# Patient Record
Sex: MALE | Race: BLACK OR AFRICAN AMERICAN | NOT HISPANIC OR LATINO | URBAN - METROPOLITAN AREA
[De-identification: names, ages, dates, MRNs, and addresses within clinical notes are randomized per-mention and may not be internally consistent; named-entity substitution may affect disease eponyms.]

---

## 2018-11-29 ENCOUNTER — OUTPATIENT (OUTPATIENT)
Dept: OUTPATIENT SERVICES | Facility: HOSPITAL | Age: 58
LOS: 1 days | Discharge: HOME | End: 2018-11-29
Payer: OTHER MISCELLANEOUS

## 2018-11-29 ENCOUNTER — RESULT REVIEW (OUTPATIENT)
Age: 58
End: 2018-11-29

## 2018-11-29 VITALS
SYSTOLIC BLOOD PRESSURE: 150 MMHG | RESPIRATION RATE: 18 BRPM | HEART RATE: 55 BPM | DIASTOLIC BLOOD PRESSURE: 93 MMHG | TEMPERATURE: 97 F | OXYGEN SATURATION: 99 %

## 2018-11-29 VITALS
HEART RATE: 55 BPM | SYSTOLIC BLOOD PRESSURE: 129 MMHG | WEIGHT: 17.64 LBS | OXYGEN SATURATION: 100 % | DIASTOLIC BLOOD PRESSURE: 97 MMHG | RESPIRATION RATE: 15 BRPM | TEMPERATURE: 98 F

## 2018-11-29 DIAGNOSIS — K40.90 UNILATERAL INGUINAL HERNIA, WITHOUT OBSTRUCTION OR GANGRENE, NOT SPECIFIED AS RECURRENT: ICD-10-CM

## 2018-11-29 DIAGNOSIS — D17.6 BENIGN LIPOMATOUS NEOPLASM OF SPERMATIC CORD: ICD-10-CM

## 2018-11-29 PROCEDURE — 88304 TISSUE EXAM BY PATHOLOGIST: CPT | Mod: 26

## 2018-11-29 RX ORDER — OXYCODONE HYDROCHLORIDE 5 MG/1
1 TABLET ORAL
Qty: 30 | Refills: 0
Start: 2018-11-29 | End: 2018-12-03

## 2018-11-29 RX ORDER — OXYCODONE HYDROCHLORIDE 5 MG/1
5 TABLET ORAL ONCE
Qty: 0 | Refills: 0 | Status: DISCONTINUED | OUTPATIENT
Start: 2018-11-29 | End: 2018-11-29

## 2018-11-29 RX ORDER — LISINOPRIL 2.5 MG/1
1 TABLET ORAL
Qty: 0 | Refills: 0 | COMMUNITY

## 2018-11-29 RX ORDER — MUPIROCIN 20 MG/G
1 OINTMENT TOPICAL
Qty: 0 | Refills: 0 | DISCHARGE
Start: 2018-11-29 | End: 2018-12-03

## 2018-11-29 RX ORDER — FENTANYL CITRATE 50 UG/ML
50 INJECTION INTRAVENOUS
Qty: 0 | Refills: 0 | Status: DISCONTINUED | OUTPATIENT
Start: 2018-11-29 | End: 2018-11-29

## 2018-11-29 RX ORDER — SODIUM CHLORIDE 9 MG/ML
1000 INJECTION, SOLUTION INTRAVENOUS
Qty: 0 | Refills: 0 | Status: DISCONTINUED | OUTPATIENT
Start: 2018-11-29 | End: 2018-11-29

## 2018-11-29 RX ORDER — ONDANSETRON 8 MG/1
4 TABLET, FILM COATED ORAL ONCE
Qty: 0 | Refills: 0 | Status: DISCONTINUED | OUTPATIENT
Start: 2018-11-29 | End: 2018-11-29

## 2018-11-29 RX ADMIN — SODIUM CHLORIDE 75 MILLILITER(S): 9 INJECTION, SOLUTION INTRAVENOUS at 15:36

## 2018-11-29 NOTE — BRIEF OPERATIVE NOTE - PROCEDURE
<<-----Click on this checkbox to enter Procedure Left inguinal hernia repair  11/29/2018    Active  [email or web address]

## 2018-11-29 NOTE — BRIEF OPERATIVE NOTE - PRE-OP DX
Inguinal hernia of left side without obstruction or gangrene  11/29/2018    Active  MARIANNA COLLADO

## 2018-12-03 DIAGNOSIS — I10 ESSENTIAL (PRIMARY) HYPERTENSION: ICD-10-CM

## 2018-12-03 DIAGNOSIS — D17.6 BENIGN LIPOMATOUS NEOPLASM OF SPERMATIC CORD: ICD-10-CM

## 2018-12-03 DIAGNOSIS — K40.90 UNILATERAL INGUINAL HERNIA, WITHOUT OBSTRUCTION OR GANGRENE, NOT SPECIFIED AS RECURRENT: ICD-10-CM

## 2018-12-03 LAB — SURGICAL PATHOLOGY FINAL REPORT - CH: SIGNIFICANT CHANGE UP

## 2019-06-04 PROBLEM — I10 ESSENTIAL (PRIMARY) HYPERTENSION: Chronic | Status: ACTIVE | Noted: 2018-11-28

## 2019-06-07 ENCOUNTER — OUTPATIENT (OUTPATIENT)
Dept: OUTPATIENT SERVICES | Facility: HOSPITAL | Age: 59
LOS: 1 days | Discharge: HOME | End: 2019-06-07
Payer: OTHER MISCELLANEOUS

## 2019-06-07 DIAGNOSIS — K40.30 UNILATERAL INGUINAL HERNIA, WITH OBSTRUCTION, WITHOUT GANGRENE, NOT SPECIFIED AS RECURRENT: ICD-10-CM

## 2019-06-07 DIAGNOSIS — D17.6 BENIGN LIPOMATOUS NEOPLASM OF SPERMATIC CORD: ICD-10-CM

## 2019-06-07 DIAGNOSIS — Z98.890 OTHER SPECIFIED POSTPROCEDURAL STATES: Chronic | ICD-10-CM

## 2019-06-07 LAB
ANION GAP SERPL CALC-SCNC: 4 MMOL/L — LOW (ref 5–17)
BASOPHILS # BLD AUTO: 0.07 K/UL — SIGNIFICANT CHANGE UP (ref 0–0.2)
BASOPHILS NFR BLD AUTO: 1.2 % — SIGNIFICANT CHANGE UP (ref 0–2)
BUN SERPL-MCNC: 13 MG/DL — SIGNIFICANT CHANGE UP (ref 7–23)
CALCIUM SERPL-MCNC: 8.9 MG/DL — SIGNIFICANT CHANGE UP (ref 8.5–10.1)
CHLORIDE SERPL-SCNC: 106 MMOL/L — SIGNIFICANT CHANGE UP (ref 96–108)
CO2 SERPL-SCNC: 26 MMOL/L — SIGNIFICANT CHANGE UP (ref 22–31)
CREAT SERPL-MCNC: 0.88 MG/DL — SIGNIFICANT CHANGE UP (ref 0.5–1.3)
EOSINOPHIL # BLD AUTO: 0.6 K/UL — HIGH (ref 0–0.5)
EOSINOPHIL NFR BLD AUTO: 10.6 % — HIGH (ref 0–6)
GLUCOSE SERPL-MCNC: 79 MG/DL — SIGNIFICANT CHANGE UP (ref 70–99)
HCT VFR BLD CALC: 51.3 % — HIGH (ref 39–50)
HGB BLD-MCNC: 16.9 G/DL — SIGNIFICANT CHANGE UP (ref 13–17)
IMM GRANULOCYTES NFR BLD AUTO: 0.2 % — SIGNIFICANT CHANGE UP (ref 0–1.5)
LYMPHOCYTES # BLD AUTO: 2.2 K/UL — SIGNIFICANT CHANGE UP (ref 1–3.3)
LYMPHOCYTES # BLD AUTO: 38.8 % — SIGNIFICANT CHANGE UP (ref 13–44)
MCHC RBC-ENTMCNC: 29.8 PG — SIGNIFICANT CHANGE UP (ref 27–34)
MCHC RBC-ENTMCNC: 32.9 GM/DL — SIGNIFICANT CHANGE UP (ref 32–36)
MCV RBC AUTO: 90.5 FL — SIGNIFICANT CHANGE UP (ref 80–100)
MONOCYTES # BLD AUTO: 0.7 K/UL — SIGNIFICANT CHANGE UP (ref 0–0.9)
MONOCYTES NFR BLD AUTO: 12.3 % — SIGNIFICANT CHANGE UP (ref 2–14)
NEUTROPHILS # BLD AUTO: 2.09 K/UL — SIGNIFICANT CHANGE UP (ref 1.8–7.4)
NEUTROPHILS NFR BLD AUTO: 36.9 % — LOW (ref 43–77)
PLATELET # BLD AUTO: 245 K/UL — SIGNIFICANT CHANGE UP (ref 150–400)
POTASSIUM SERPL-MCNC: 4.5 MMOL/L — SIGNIFICANT CHANGE UP (ref 3.5–5.3)
POTASSIUM SERPL-SCNC: 4.5 MMOL/L — SIGNIFICANT CHANGE UP (ref 3.5–5.3)
RBC # BLD: 5.67 M/UL — SIGNIFICANT CHANGE UP (ref 4.2–5.8)
RBC # FLD: 13.5 % — SIGNIFICANT CHANGE UP (ref 10.3–14.5)
SODIUM SERPL-SCNC: 136 MMOL/L — SIGNIFICANT CHANGE UP (ref 135–145)
WBC # BLD: 5.67 K/UL — SIGNIFICANT CHANGE UP (ref 3.8–10.5)
WBC # FLD AUTO: 5.67 K/UL — SIGNIFICANT CHANGE UP (ref 3.8–10.5)

## 2019-06-07 PROCEDURE — 93010 ELECTROCARDIOGRAM REPORT: CPT

## 2019-06-07 NOTE — CHART NOTE - NSCHARTNOTEFT_GEN_A_CORE
59 year old male presents to PST for right inguinal hernia repair     Plan:  1. Pre-surgical instructions given: NPO post midnight  2. Patient Instructed to take the following medications with sips of water: losartan lisinopril   3.  Labs EKG  done as per surgeon request  4. Medical Evaluation with Dr Warner   5. EZ wash instructions and Mupirocin instructions  given with teach back  6. Pt with increased /100 pt said he has not been taking his meds daily; denies headache blurry vision ; pt going to Dr Warner for medical clearance today

## 2019-06-10 RX ORDER — MUPIROCIN 20 MG/G
1 OINTMENT TOPICAL
Qty: 0 | Refills: 0 | DISCHARGE
Start: 2019-06-10 | End: 2019-06-14

## 2019-06-12 RX ORDER — SODIUM CHLORIDE 9 MG/ML
1000 INJECTION, SOLUTION INTRAVENOUS
Refills: 0 | Status: DISCONTINUED | OUTPATIENT
Start: 2019-06-13 | End: 2019-06-13

## 2019-06-12 RX ORDER — FENTANYL CITRATE 50 UG/ML
50 INJECTION INTRAVENOUS
Refills: 0 | Status: DISCONTINUED | OUTPATIENT
Start: 2019-06-13 | End: 2019-06-13

## 2019-06-13 ENCOUNTER — RESULT REVIEW (OUTPATIENT)
Age: 59
End: 2019-06-13

## 2019-06-13 ENCOUNTER — OUTPATIENT (OUTPATIENT)
Dept: OUTPATIENT SERVICES | Facility: HOSPITAL | Age: 59
LOS: 1 days | Discharge: HOME | End: 2019-06-13
Payer: OTHER MISCELLANEOUS

## 2019-06-13 VITALS
RESPIRATION RATE: 15 BRPM | SYSTOLIC BLOOD PRESSURE: 139 MMHG | DIASTOLIC BLOOD PRESSURE: 99 MMHG | TEMPERATURE: 98 F | HEART RATE: 56 BPM | OXYGEN SATURATION: 99 % | WEIGHT: 181 LBS

## 2019-06-13 VITALS
RESPIRATION RATE: 16 BRPM | SYSTOLIC BLOOD PRESSURE: 140 MMHG | HEART RATE: 54 BPM | OXYGEN SATURATION: 98 % | DIASTOLIC BLOOD PRESSURE: 95 MMHG

## 2019-06-13 DIAGNOSIS — Z98.890 OTHER SPECIFIED POSTPROCEDURAL STATES: Chronic | ICD-10-CM

## 2019-06-13 PROCEDURE — 88304 TISSUE EXAM BY PATHOLOGIST: CPT | Mod: 26

## 2019-06-13 RX ORDER — OXYCODONE HYDROCHLORIDE 5 MG/1
1 TABLET ORAL
Qty: 12 | Refills: 0
Start: 2019-06-13 | End: 2019-06-14

## 2019-06-13 RX ORDER — OXYCODONE AND ACETAMINOPHEN 5; 325 MG/1; MG/1
1 TABLET ORAL EVERY 4 HOURS
Refills: 0 | Status: DISCONTINUED | OUTPATIENT
Start: 2019-06-13 | End: 2019-06-13

## 2019-06-13 RX ORDER — ONDANSETRON 8 MG/1
4 TABLET, FILM COATED ORAL ONCE
Refills: 0 | Status: COMPLETED | OUTPATIENT
Start: 2019-06-13 | End: 2019-06-13

## 2019-06-13 RX ORDER — OXYCODONE HYDROCHLORIDE 5 MG/1
5 TABLET ORAL ONCE
Refills: 0 | Status: DISCONTINUED | OUTPATIENT
Start: 2019-06-13 | End: 2019-06-13

## 2019-06-13 RX ADMIN — OXYCODONE HYDROCHLORIDE 5 MILLIGRAM(S): 5 TABLET ORAL at 13:55

## 2019-06-13 RX ADMIN — ONDANSETRON 4 MILLIGRAM(S): 8 TABLET, FILM COATED ORAL at 12:43

## 2019-06-13 RX ADMIN — OXYCODONE HYDROCHLORIDE 5 MILLIGRAM(S): 5 TABLET ORAL at 12:24

## 2019-06-13 NOTE — ASU DISCHARGE PLAN (ADULT/PEDIATRIC) - CALL YOUR DOCTOR IF YOU HAVE ANY OF THE FOLLOWING:
Nausea and vomiting that does not stop/Swelling that gets worse/Fever greater than (need to indicate Fahrenheit or Celsius)/Unable to urinate/Bleeding that does not stop/Numbness, tingling, color or temperature change to extremity/Wound/Surgical Site with redness, or foul smelling discharge or pus/Inability to tolerate liquids or foods/Pain not relieved by Medications/Excessive diarrhea/Increased irritability or sluggishness

## 2019-06-13 NOTE — ASU DISCHARGE PLAN (ADULT/PEDIATRIC) - CARE PROVIDER_API CALL
Ru Yates)  Surgery; Surgical Critical Care  [address] [place] [address]  Phone: [phone]  Fax: [phone]  Follow Up Time:

## 2019-06-14 ENCOUNTER — EMERGENCY (EMERGENCY)
Facility: HOSPITAL | Age: 59
LOS: 0 days | Discharge: HOME | End: 2019-06-14
Attending: EMERGENCY MEDICINE | Admitting: EMERGENCY MEDICINE
Payer: OTHER MISCELLANEOUS

## 2019-06-14 VITALS
DIASTOLIC BLOOD PRESSURE: 95 MMHG | OXYGEN SATURATION: 96 % | TEMPERATURE: 98 F | SYSTOLIC BLOOD PRESSURE: 152 MMHG | HEART RATE: 62 BPM | RESPIRATION RATE: 18 BRPM

## 2019-06-14 VITALS — HEIGHT: 72 IN | WEIGHT: 179.9 LBS

## 2019-06-14 DIAGNOSIS — I10 ESSENTIAL (PRIMARY) HYPERTENSION: ICD-10-CM

## 2019-06-14 DIAGNOSIS — R11.2 NAUSEA WITH VOMITING, UNSPECIFIED: ICD-10-CM

## 2019-06-14 DIAGNOSIS — Z98.890 OTHER SPECIFIED POSTPROCEDURAL STATES: Chronic | ICD-10-CM

## 2019-06-14 DIAGNOSIS — G89.18 OTHER ACUTE POSTPROCEDURAL PAIN: ICD-10-CM

## 2019-06-14 DIAGNOSIS — K59.00 CONSTIPATION, UNSPECIFIED: ICD-10-CM

## 2019-06-14 DIAGNOSIS — R10.9 UNSPECIFIED ABDOMINAL PAIN: ICD-10-CM

## 2019-06-14 DIAGNOSIS — F17.200 NICOTINE DEPENDENCE, UNSPECIFIED, UNCOMPLICATED: ICD-10-CM

## 2019-06-14 PROBLEM — K40.90 UNILATERAL INGUINAL HERNIA, WITHOUT OBSTRUCTION OR GANGRENE, NOT SPECIFIED AS RECURRENT: Chronic | Status: ACTIVE | Noted: 2019-06-07

## 2019-06-14 LAB
ALBUMIN SERPL ELPH-MCNC: 4.2 G/DL — SIGNIFICANT CHANGE UP (ref 3.3–5)
ALP SERPL-CCNC: 81 U/L — SIGNIFICANT CHANGE UP (ref 40–120)
ALT FLD-CCNC: 66 U/L — SIGNIFICANT CHANGE UP (ref 12–78)
ANION GAP SERPL CALC-SCNC: 12 MMOL/L — SIGNIFICANT CHANGE UP (ref 5–17)
AST SERPL-CCNC: 32 U/L — SIGNIFICANT CHANGE UP (ref 15–37)
BASOPHILS # BLD AUTO: 0.06 K/UL — SIGNIFICANT CHANGE UP (ref 0–0.2)
BASOPHILS NFR BLD AUTO: 0.5 % — SIGNIFICANT CHANGE UP (ref 0–2)
BILIRUB SERPL-MCNC: 1.1 MG/DL — SIGNIFICANT CHANGE UP (ref 0.2–1.2)
BUN SERPL-MCNC: 15 MG/DL — SIGNIFICANT CHANGE UP (ref 7–23)
CALCIUM SERPL-MCNC: 9.3 MG/DL — SIGNIFICANT CHANGE UP (ref 8.5–10.1)
CHLORIDE SERPL-SCNC: 102 MMOL/L — SIGNIFICANT CHANGE UP (ref 96–108)
CO2 SERPL-SCNC: 22 MMOL/L — SIGNIFICANT CHANGE UP (ref 22–31)
CREAT SERPL-MCNC: 0.94 MG/DL — SIGNIFICANT CHANGE UP (ref 0.5–1.3)
EOSINOPHIL # BLD AUTO: 0.06 K/UL — SIGNIFICANT CHANGE UP (ref 0–0.5)
EOSINOPHIL NFR BLD AUTO: 0.5 % — SIGNIFICANT CHANGE UP (ref 0–6)
GLUCOSE SERPL-MCNC: 129 MG/DL — HIGH (ref 70–99)
HCT VFR BLD CALC: 54.5 % — HIGH (ref 39–50)
HGB BLD-MCNC: 18.8 G/DL — HIGH (ref 13–17)
IMM GRANULOCYTES NFR BLD AUTO: 0.4 % — SIGNIFICANT CHANGE UP (ref 0–1.5)
LACTATE SERPL-SCNC: 1.8 MMOL/L — SIGNIFICANT CHANGE UP (ref 0.7–2)
LIDOCAIN IGE QN: 67 U/L — LOW (ref 73–393)
LYMPHOCYTES # BLD AUTO: 18.2 % — SIGNIFICANT CHANGE UP (ref 13–44)
LYMPHOCYTES # BLD AUTO: 2.37 K/UL — SIGNIFICANT CHANGE UP (ref 1–3.3)
MAGNESIUM SERPL-MCNC: 1.9 MG/DL — SIGNIFICANT CHANGE UP (ref 1.6–2.6)
MCHC RBC-ENTMCNC: 29.9 PG — SIGNIFICANT CHANGE UP (ref 27–34)
MCHC RBC-ENTMCNC: 34.5 GM/DL — SIGNIFICANT CHANGE UP (ref 32–36)
MCV RBC AUTO: 86.8 FL — SIGNIFICANT CHANGE UP (ref 80–100)
MONOCYTES # BLD AUTO: 1.28 K/UL — HIGH (ref 0–0.9)
MONOCYTES NFR BLD AUTO: 9.8 % — SIGNIFICANT CHANGE UP (ref 2–14)
NEUTROPHILS # BLD AUTO: 9.22 K/UL — HIGH (ref 1.8–7.4)
NEUTROPHILS NFR BLD AUTO: 70.6 % — SIGNIFICANT CHANGE UP (ref 43–77)
PLATELET # BLD AUTO: 272 K/UL — SIGNIFICANT CHANGE UP (ref 150–400)
POTASSIUM SERPL-MCNC: 3.4 MMOL/L — LOW (ref 3.5–5.3)
POTASSIUM SERPL-SCNC: 3.4 MMOL/L — LOW (ref 3.5–5.3)
PROT SERPL-MCNC: 8.8 GM/DL — HIGH (ref 6–8.3)
RBC # BLD: 6.28 M/UL — HIGH (ref 4.2–5.8)
RBC # FLD: 13.2 % — SIGNIFICANT CHANGE UP (ref 10.3–14.5)
SODIUM SERPL-SCNC: 136 MMOL/L — SIGNIFICANT CHANGE UP (ref 135–145)
WBC # BLD: 13.04 K/UL — HIGH (ref 3.8–10.5)
WBC # FLD AUTO: 13.04 K/UL — HIGH (ref 3.8–10.5)

## 2019-06-14 PROCEDURE — 74177 CT ABD & PELVIS W/CONTRAST: CPT | Mod: 26

## 2019-06-14 PROCEDURE — 93010 ELECTROCARDIOGRAM REPORT: CPT

## 2019-06-14 PROCEDURE — 99285 EMERGENCY DEPT VISIT HI MDM: CPT

## 2019-06-14 RX ORDER — ONDANSETRON 8 MG/1
1 TABLET, FILM COATED ORAL
Qty: 12 | Refills: 0
Start: 2019-06-14 | End: 2019-06-16

## 2019-06-14 RX ORDER — AMLODIPINE BESYLATE 2.5 MG/1
1 TABLET ORAL
Qty: 0 | Refills: 0 | DISCHARGE

## 2019-06-14 RX ORDER — MORPHINE SULFATE 50 MG/1
4 CAPSULE, EXTENDED RELEASE ORAL ONCE
Refills: 0 | Status: DISCONTINUED | OUTPATIENT
Start: 2019-06-14 | End: 2019-06-14

## 2019-06-14 RX ORDER — ONDANSETRON 8 MG/1
1 TABLET, FILM COATED ORAL
Qty: 16 | Refills: 0
Start: 2019-06-14 | End: 2019-06-17

## 2019-06-14 RX ORDER — LISINOPRIL 2.5 MG/1
1 TABLET ORAL
Qty: 0 | Refills: 0 | DISCHARGE

## 2019-06-14 RX ORDER — SODIUM CHLORIDE 9 MG/ML
1000 INJECTION INTRAMUSCULAR; INTRAVENOUS; SUBCUTANEOUS ONCE
Refills: 0 | Status: COMPLETED | OUTPATIENT
Start: 2019-06-14 | End: 2019-06-14

## 2019-06-14 RX ORDER — ONDANSETRON 8 MG/1
4 TABLET, FILM COATED ORAL ONCE
Refills: 0 | Status: COMPLETED | OUTPATIENT
Start: 2019-06-14 | End: 2019-06-14

## 2019-06-14 RX ORDER — FAMOTIDINE 10 MG/ML
20 INJECTION INTRAVENOUS ONCE
Refills: 0 | Status: COMPLETED | OUTPATIENT
Start: 2019-06-14 | End: 2019-06-14

## 2019-06-14 RX ORDER — DOCUSATE SODIUM 100 MG
2 CAPSULE ORAL
Qty: 0 | Refills: 0 | DISCHARGE

## 2019-06-14 RX ADMIN — SODIUM CHLORIDE 1000 MILLILITER(S): 9 INJECTION INTRAMUSCULAR; INTRAVENOUS; SUBCUTANEOUS at 17:52

## 2019-06-14 RX ADMIN — MORPHINE SULFATE 4 MILLIGRAM(S): 50 CAPSULE, EXTENDED RELEASE ORAL at 16:47

## 2019-06-14 RX ADMIN — SODIUM CHLORIDE 2000 MILLILITER(S): 9 INJECTION INTRAMUSCULAR; INTRAVENOUS; SUBCUTANEOUS at 16:48

## 2019-06-14 RX ADMIN — ONDANSETRON 4 MILLIGRAM(S): 8 TABLET, FILM COATED ORAL at 16:47

## 2019-06-14 RX ADMIN — MORPHINE SULFATE 4 MILLIGRAM(S): 50 CAPSULE, EXTENDED RELEASE ORAL at 17:15

## 2019-06-14 RX ADMIN — SODIUM CHLORIDE 1000 MILLILITER(S): 9 INJECTION INTRAMUSCULAR; INTRAVENOUS; SUBCUTANEOUS at 19:03

## 2019-06-14 RX ADMIN — FAMOTIDINE 20 MILLIGRAM(S): 10 INJECTION INTRAVENOUS at 16:52

## 2019-06-14 NOTE — ED PROVIDER NOTE - PROGRESS NOTE DETAILS
Rebecca Robles: Paging Dr. Yates. Rebecca NP for Dr. Robles:  Case discussed with Dr. Yates who suspects symptoms likely due to affects from general anesthesia. Agrees with CT abd, pelvis. If further problems or issues develop, discuss with surgical resident. Dr. Robles:  Pt examined by Dr. Yates & Sx resident at ED bedside, who also reviewed CT images.  Official CT report pending. Further management pending CT report & whether pt can tolerate po trial.  If CT acceptable & pt + tolerates po, Dr. GAYLE advises D/C home w/ escript for Zofran, outpt office f/u next week. Rebecca KAUR for Dr. Robles: CT abd pelvis +constipation post surgical changes. Will attempt PO trial now. Dr. Robles:  Pt symptomatically improved, + tolerating po fluids trial & crackers.  Pt stable for D/C home, will escript Zofran script, outpt f/u next week Dr. Yates.

## 2019-06-14 NOTE — ED PROVIDER NOTE - GASTROINTESTINAL, MLM
Abdomen soft, non-tender, no guarding. Bowel sounds somewhat hypoactive, normal pitch. Surgical dressing in place right inguinal area appears dry, +surrounding TTP RLQ. No guarding, rebound.

## 2019-06-14 NOTE — ED ADULT TRIAGE NOTE - CHIEF COMPLAINT QUOTE
pt presents to ED s/p right inguinal hernia repair with MD Mitchell yesterday. pt states he has been vomiting and having left sided abdominal pain since discharge yesterday.

## 2019-06-14 NOTE — ED ADULT NURSE REASSESSMENT NOTE - NS ED NURSE REASSESS COMMENT FT1
Patient refusing to have rectal temperature taken.
change of shift report received from ANETTE Smith for continuation of care. pt is alert and oriented, accompanied by family member at bedside. pt has been assessed by the surgeon, pt is to be p.o. challenged as per MD Robles. IV access removed as requested, MD Robles made aware.

## 2019-06-14 NOTE — ED PROVIDER NOTE - ENMT, MLM
Airway patent, Nasal mucosa clear. MM Mildly dry. Throat has no vesicles, no oropharyngeal exudates and uvula is midline.

## 2019-06-14 NOTE — ED PROVIDER NOTE - CARE PROVIDER_API CALL
Ru Yates)  Surgery; Surgical Critical Care  [address] [place] [address]  Phone: [phone]  Fax: [phone]  Follow Up Time:

## 2019-06-14 NOTE — ED ADULT NURSE NOTE - NSIMPLEMENTINTERV_GEN_ALL_ED
Implemented All Universal Safety Interventions:  [place] to call system. Call bell, personal items and telephone within reach. Instruct patient to call for assistance. Room bathroom lighting operational. Non-slip footwear when patient is off stretcher. Physically safe environment: no spills, clutter or unnecessary equipment. Stretcher in lowest position, wheels locked, appropriate side rails in place.

## 2019-06-14 NOTE — ED PROVIDER NOTE - CARE PLAN
Principal Discharge DX:	Postoperative nausea and vomiting  Secondary Diagnosis:	Postoperative abdominal pain

## 2019-06-14 NOTE — ED STATDOCS - PROGRESS NOTE DETAILS
Rebecca Maria for attending Dr. Altamirano: 58 y/o male with a PMHx of inguinal hernia, HTN presents to the ED c/o post op complication. Pt had a right inguinal hernia repair yesterday by Dr. Yates. Pt has been vomiting since surgery. Pt did not take HTN medications today. No other complaints at this time. Will send pt to main ED for further evaluation.

## 2019-06-14 NOTE — ED PROVIDER NOTE - NSFOLLOWUPINSTRUCTIONS_ED_ALL_ED_FT
Small sips oral fluids frequently.  Zofran ODT 1 tab under the tongue every 6 hours as needed fro nausea.  Emphasize oral fluids more than solid foods at this early post-operative point.  Stool softeners as per prior.  Follow up with Dr. Yates in 2 weeks. Small sips oral fluids frequently.  Zofran ODT 1 tab under the tongue every 6 hours as needed fro nausea.  Emphasize oral fluids more than solid foods at this early post-operative point.  Stool softeners as per prior.  Follow up with Dr. Yates in 2 weeks.    ED evaluation and management discussed with the patient and family (if available) in detail.  Close PMD follow up encouraged.  Strict ED return instructions discussed in detail and patient given the opportunity to ask any questions about their discharge diagnosis and instructions. Patient verbalized understanding.    Pain Relief Before and After Surgery  Image   Pain relief is an important part of your overall care before, during, and after surgery. You and your health care provider will work together to make a plan to manage pain that you have before surgery (preoperative) and after surgery (postoperative). Addressing pain before surgery lessens the pain that you will have after surgery.    Make sure that you fully understand and agree with your pain relief plan. If you have questions or concerns, it is important to discuss them with your health care provider.    If you have pain that is not controlled by medicine, tell your health care provider. Severe pain after surgery may:  Prevent sleep.  Decrease your ability to breathe deeply and to cough. This can result in pneumonia or upper airway infections.  Cause your heart to beat more quickly.  Cause your blood pressure to be higher.  Increase your risk for stomach and digestive problems.  Slow down wound healing.  Lead to depression, anxiety, and feelings of helplessness.  Your health care provider may use more than one method at a time to help relieve your pain. Using this approach may allow you to eat, move around, and possibly leave the hospital sooner.    What are options for managing pain before and after surgery?  Oral pain medicines     Pain medicines taken by mouth (orally) include:  Non-narcotic medicines:  Acetaminophen.  NSAIDs, such as ibuprofen and naproxen.  Muscle relaxants. These may relieve pain caused by muscle spasms.  Anticonvulsants. These medicines are usually used to treat seizures. They may help to lessen nerve pain.  Opioids. These medicines relieve pain by binding to pain receptors in the brain and spinal cord (narcotic pain medicines). Opioids may help relieve short-term (acute) postoperative pain that is moderate to moderately severe.  Opioids are often combined with non-narcotic medicines to improve pain relief, lower the risk of side effects, and lower the chance of addiction.  To help prevent addiction, opioids are given for short periods of time in careful doses. If you follow instructions from your health care provider and you do not have a history of substance abuse, your risk of becoming addicted to opioids is low.  Some of these medicines may be available in injectable form. They may be given through an IV if you are unable to eat or drink.    As-needed pain control    You can receive pain medicine when you need it, through an IV or as a pill or liquid. When you tell your health care provider that you are having pain, he or she will give you the proper pain medicine.    Medicine that numbs an area    You may be given pain medicine that numbs an area (local anesthetic):  As an injection near your painful area (local infiltration).  As an injection near the nerve that provides feeling to a specific part of your body (peripheral nerve block).  As an injection in your spine (spinal block).  Through a local anesthetic reservoir pump. For this method, one or more catheters are inserted into your incision at the end of your procedure. These catheters are connected to a device that is filled with a non-narcotic pain medicine. Medicine gradually empties into your incision area over the next several days.  Continuous epidural pain control    With this method, you receive pain medicine through a small, thin tube (catheter) that is inserted into your back, near your spinal cord. Medicine flows through the catheter to lessen pain in areas of your body that are below the catheter. The catheter is usually put into the back shortly before surgery. It may be left in until you can eat, take medicine by mouth, pass urine, and have a bowel movement.    This method may be recommended if you are having surgery on your abdomen, hip area, or legs. This method of pain relief may help you heal faster because you may be able to do these things sooner:  Regain normal bowel and bladder function.  Return to eating.  Get up and walk.  IV patient-controlled analgesia (PCA) pump    With this method, you receive pain medicine through an IV that is connected to a PCA pump. The PCA pump gives you a specific amount of medicine when you push a button. This lets you control how much medicine you receive. You are the only person who should push this button. The pump is set up so that you cannot accidentally give yourself too much medicine.    You will be able to start using your PCA pump in the recovery room after your procedure. Tell your health care provider:  If you are having too much pain.  If you cannot push the button.  If you are feeling too sleepy or nauseous.  Other pain control methods    Other methods of pain relief after surgery include:  Heat and cold therapy.  Massage.  Topical analgesics. These are patches, creams, and gels that can be applied on the skin.  Steroid medicines. These medicines may be given to lessen swelling.  Physical therapy. A physical therapist will work with you to meet goals, such as feeling and functioning better. Physical therapy usually includes specific exercises that are tailored to your needs.  Transcutaneous electrical nerve stimulation (TENS). This method sends electrical signals through the skin to interrupt pain signals.  Cognitive behavioral therapy (CBT). This therapy helps you learn coping skills for dealing with pain.  What are some questions to ask my health care provider?  What pain relief options would be best for me?  What are the risks of each option?  What are the benefits of each option?  How long will I need pain relief after surgery?  Summary  A plan to manage pain that you may have before surgery (preoperative) and after surgery (postoperative) is an important part of your overall care.  Pain management options include medicines and non-medical therapies, such as physical therapy, massage, and heat or cold therapy.  Pain management medicines include opioids and non-narcotic medicines such as NSAIDs, steroids, or local anesthetics.  Pain medicines can have side effects. Side effects of opioids include constipation, nausea, excessive sleepiness, and risk of addiction. Your health care provider will work with you to prevent or manage these side effects and risks.  This information is not intended to replace advice given to you by your health care provider. Make sure you discuss any questions you have with your health care provider. Small sips oral fluids frequently.  Zofran ODT 1 tab under the tongue every 6 hours as needed fro nausea.  Emphasize oral fluids more than solid foods at this early post-operative point.  Stool softeners as per prior.  Follow up with Dr. Yates in 2 weeks.    ED evaluation and management discussed with the patient and family (if available) in detail.  Close PMD follow up encouraged.  Strict ED return instructions discussed in detail and patient given the opportunity to ask any questions about their discharge diagnosis and instructions. Patient verbalized understanding.    Pain Relief Before and After Surgery  Image   Pain relief is an important part of your overall care before, during, and after surgery. You and your health care provider will work together to make a plan to manage pain that you have before surgery (preoperative) and after surgery (postoperative). Addressing pain before surgery lessens the pain that you will have after surgery.    Make sure that you fully understand and agree with your pain relief plan. If you have questions or concerns, it is important to discuss them with your health care provider.    If you have pain that is not controlled by medicine, tell your health care provider. Severe pain after surgery may:  Prevent sleep.  Decrease your ability to breathe deeply and to cough. This can result in pneumonia or upper airway infections.  Cause your heart to beat more quickly.  Cause your blood pressure to be higher.  Increase your risk for stomach and digestive problems.  Slow down wound healing.  Lead to depression, anxiety, and feelings of helplessness.  Your health care provider may use more than one method at a time to help relieve your pain. Using this approach may allow you to eat, move around, and possibly leave the hospital sooner.    What are options for managing pain before and after surgery?  Oral pain medicines     Pain medicines taken by mouth (orally) include:  Non-narcotic medicines:  Acetaminophen.  NSAIDs, such as ibuprofen and naproxen.  Muscle relaxants. These may relieve pain caused by muscle spasms.  Anticonvulsants. These medicines are usually used to treat seizures. They may help to lessen nerve pain.  Opioids. These medicines relieve pain by binding to pain receptors in the brain and spinal cord (narcotic pain medicines). Opioids may help relieve short-term (acute) postoperative pain that is moderate to moderately severe.  Opioids are often combined with non-narcotic medicines to improve pain relief, lower the risk of side effects, and lower the chance of addiction.  To help prevent addiction, opioids are given for short periods of time in careful doses. If you follow instructions from your health care provider and you do not have a history of substance abuse, your risk of becoming addicted to opioids is low.  Some of these medicines may be available in injectable form. They may be given through an IV if you are unable to eat or drink.    As-needed pain control    You can receive pain medicine when you need it, through an IV or as a pill or liquid. When you tell your health care provider that you are having pain, he or she will give you the proper pain medicine.    Medicine that numbs an area    You may be given pain medicine that numbs an area (local anesthetic):  As an injection near your painful area (local infiltration).  As an injection near the nerve that provides feeling to a specific part of your body (peripheral nerve block).  As an injection in your spine (spinal block).  Through a local anesthetic reservoir pump. For this method, one or more catheters are inserted into your incision at the end of your procedure. These catheters are connected to a device that is filled with a non-narcotic pain medicine. Medicine gradually empties into your incision area over the next several days.  Continuous epidural pain control    With this method, you receive pain medicine through a small, thin tube (catheter) that is inserted into your back, near your spinal cord. Medicine flows through the catheter to lessen pain in areas of your body that are below the catheter. The catheter is usually put into the back shortly before surgery. It may be left in until you can eat, take medicine by mouth, pass urine, and have a bowel movement.    This method may be recommended if you are having surgery on your abdomen, hip area, or legs. This method of pain relief may help you heal faster because you may be able to do these things sooner:  Regain normal bowel and bladder function.  Return to eating.  Get up and walk.  IV patient-controlled analgesia (PCA) pump    With this method, you receive pain medicine through an IV that is connected to a PCA pump. The PCA pump gives you a specific amount of medicine when you push a button. This lets you control how much medicine you receive. You are the only person who should push this button. The pump is set up so that you cannot accidentally give yourself too much medicine.    You will be able to start using your PCA pump in the recovery room after your procedure. Tell your health care provider:  If you are having too much pain.  If you cannot push the button.  If you are feeling too sleepy or nauseous.  Other pain control methods    Other methods of pain relief after surgery include:  Heat and cold therapy.  Massage.  Topical analgesics. These are patches, creams, and gels that can be applied on the skin.  Steroid medicines. These medicines may be given to lessen swelling.  Physical therapy. A physical therapist will work with you to meet goals, such as feeling and functioning better. Physical therapy usually includes specific exercises that are tailored to your needs.  Transcutaneous electrical nerve stimulation (TENS). This method sends electrical signals through the skin to interrupt pain signals.  Cognitive behavioral therapy (CBT). This therapy helps you learn coping skills for dealing with pain.  What are some questions to ask my health care provider?  What pain relief options would be best for me?  What are the risks of each option?  What are the benefits of each option?  How long will I need pain relief after surgery?  Summary  A plan to manage pain that you may have before surgery (preoperative) and after surgery (postoperative) is an important part of your overall care.  Pain management options include medicines and non-medical therapies, such as physical therapy, massage, and heat or cold therapy.  Pain management medicines include opioids and non-narcotic medicines such as NSAIDs, steroids, or local anesthetics.  Pain medicines can have side effects. Side effects of opioids include constipation, nausea, excessive sleepiness, and risk of addiction. Your health care provider will work with you to prevent or manage these side effects and risks.  This information is not intended to replace advice given to you by your health care provider. Make sure you discuss any questions you have with your health care provider.        Nausea / Vomiting    Nausea is the feeling that you have to vomit. As nausea gets worse, it can lead to vomiting. Vomiting puts you at an increased risk for dehydration. Older adults and people with other diseases or a weak immune system are at higher risk for dehydration. Drink clear fluids in small but frequent amounts as tolerated. Eat bland, easy-to-digest foods in small amounts as tolerated.    SEEK IMMEDIATE MEDICAL CARE IF YOU HAVE ANY OF THE FOLLOWING SYMPTOMS: fever, inability to keep sufficient fluids down, black or bloody vomitus, black or bloody stools, lightheadedness/dizziness, chest pain, severe headache, rash, shortness of breath, cold or clammy skin, confusion, pain with urination, or any signs of dehydration.          Preventing Constipation After Surgery  Constipation is a common problem after surgery. Many things can make constipation more likely after a surgery, including:  Certain medicines, especially numbing medicines (anesthetics) and very strong pain medicines called opioids.  Feeling stressed because of the surgery.  Eating different foods than normal.  Being less active.  Symptoms of constipation include:  Having fewer than three bowel movements a week.  Straining to have a bowel movement.  Having hard, dry, or larger-than-normal stools.  Feeling full or bloated.  Having pain in the lower abdomen.  Not feeling relief after having a bowel movement.  You can take steps to help prevent constipation after surgery.    Follow these instructions at home:  Eating and drinking     Image   Eat foods that have a lot of fiber in them. These include fresh fruits and vegetables, whole grains, and beans.  Limit foods that are high in fat and processed sugars, such as fried and sweet foods. These include french fries, hamburgers, cookies, and candy.  Take a fiber supplement as told by your health care provider. If you are not taking a fiber supplement and you think you are not getting enough fiber from foods, talk to your health care provider about adding a fiber supplement to your diet.  Drink enough fluid to keep your urine pale yellow.  Drink clear fluids, especially water. Avoid drinking alcohol, caffeine, and soda. These can make constipation worse.  Activity     After surgery, return to your normal activities slowly, or when your health care provider says it is okay.  Start walking as soon as you can. Try to go a little farther each day.  Once your health care provider approves, do some sort of regular exercise. This helps prevent constipation.  Bowel movements     Go to the restroom when you have the urge to go. Do not hold it in.  Try drinking something hot to get a bowel movement started.  Keep track of how often you use the restroom.  Medicine     Take over-the-counter and prescription medicines only as told by your health care provider.  Talk to your health care provider about medicines that may help prevent constipation, particularly if you have a history of constipation. Your health care provider may suggest a stool softener, laxative, or fiber supplement.  Do not take any medicines without talking to your health care provider first.  Contact a health care provider if:  You used stool softeners or laxatives and still have not had a bowel movement within 24–48 hours after using them.  You have not had a bowel movement in 3 days.  You have a fever.  Get help right away if:  Your constipation lasts for more than 4 days or gets worse.  You have bright red blood in your stool.  You have pain in the abdomen or rectum.  You have very bad cramping.  You have thin, pencil-like stools.  You have unexplained weight loss.  Summary  Constipation is a common problem after surgery. Many things can make constipation more likely after a surgery, including certain medicines, eating different foods than normal, and being less active.  Symptoms of constipation include having fewer than three bowel movements a week, straining to have a bowel movement, pain in the lower abdomen, and feeling full or bloated.  A diet rich in high-fiber foods, fluids, physical activity, and medicines, such as stool softeners and laxatives, can help prevent constipation.  This information is not intended to replace advice given to you by your health care provider. Make sure you discuss any questions you have with your health care provider.    Document Released: 04/14/2014 Document Revised: 03/19/2018 Document Reviewed: 03/19/2018  [employer] Patient Education © 2019 [employer]

## 2019-06-14 NOTE — ED ADULT NURSE NOTE - OBJECTIVE STATEMENT
pt is a 59 y.o. male c/o post op complication, s/p hernia operation POD#1, by Carmen HORTON. pt c/o nausea and constipation. vital signs as charted. will continue to monitor. please see MD note for further information.

## 2019-06-14 NOTE — ED PROVIDER NOTE - ST/T WAVE
Did call pt yesterday and d/w her about radiologist recommendation of breast bx given result of breast mri. She already had this scheduled.
Pt called requesting to speak with Dr bardales mammogram
normal

## 2019-06-14 NOTE — CONSULT NOTE ADULT - ASSESSMENT
A/P:  59M POD 1 from Right Inguinal hernia repair with mesh.  Postoperative nausea and vomiting.  No hernia present on exam and no acute process noted within the abdomen on CT imaging.  Constipation.  - Pain control with non-narcotics is tolerable  - Nausea control PRN  - Continue stool softeners  - No acute surgical intervention neccessary  - Please have patient follow up with Dr. Yates for postop visit in 2 weeks    discussed and seen at bedside with Dr. Yates

## 2019-06-14 NOTE — ED PROVIDER NOTE - OBJECTIVE STATEMENT
60 y/o male with a PMHx of HTN, inguinal hernia, s/p 1 day open right inguinal hernia repair presents to the ED c/o abd pain and vomiting since last night. As per family at bed side pt had a right inguinal hernia repair yesterday by Dr. Yates and DC that evening. Pt has had nausea and vomiting since surgery. Since last night pt has had over 25 episodes of vomiting. States this afternoon vomiting began to have trace amounts of blood in it. Pt also with diaphoresis, poor appetite. While in ED pt has been vomiting, last was 2 minutes ago. +Subjective fever, chills. Pt describes abd pain as constant, cramping and severe. Pain worse when walking. Has not had a BM for the past two days, usually has a BM every day. Surgeon: Dr. Yates PMD: Dr. Warner.

## 2019-06-14 NOTE — ED PROVIDER NOTE - CLINICAL SUMMARY MEDICAL DECISION MAKING FREE TEXT BOX
60 y/o male PO day number 1 s/p elective right open inguinal hernia repair ambulatory to ED regarding pain, persistent nausea and vomiting. +TTP RLQ, surgical bandage appears dry and intact.   Plan: EKG, labs, temperature, IV fluids, IV Pepcid, morphine, Zofran and discuss with surgeon.

## 2019-06-14 NOTE — CONSULT NOTE ADULT - SUBJECTIVE AND OBJECTIVE BOX
Called to ED to evaluate patient for recent postop nausea/vomiting and abdominal pain.  Seen and examined at bedside with Dr. Yates immediately.    Mr. Levine is a 59M with PMH of who presents with a chief complaint of nausea and vomiting that has persisted since surgery yesterday.  Patient is POD 1 from an open right inguinal hernia repair with mesh (6/13/2019), had an uneventful postoperative course and was discharged by [facility].  Patient had Hx of Left inguinal hernia repair and admits less pain this time than last time.  Patient states he was up all night due to nausea and vomiting and came into the hospital after noting BRB in vomitus.  Admits retching all night and some mild pain over the hernia repair site.     PMH:  HTN  PSH:  Left inguinal hernia repair, Right inguinal hernia repair (6/13/2019)  + Smoking Hx  NKDA    PE:  ICU Vital Signs Last 24 Hrs  T(C): 36.7 (14 Jun 2019 18:48), Max: 36.7 (14 Jun 2019 18:48)  T(F): 98 (14 Jun 2019 18:48), Max: 98 (14 Jun 2019 18:48)  HR: 60 (14 Jun 2019 18:48) (60 - 81)  BP: 165/93 (14 Jun 2019 18:48) (165/93 - 192/108)  BP(mean): --  ABP: --  ABP(mean): --  RR: 18 (14 Jun 2019 18:48) (18 - 20)  SpO2: 97% (14 Jun 2019 18:48) (97% - 100%)    Gen:  NAD, AOx3  CV:  s1 s2  Pulm:  CTAB  abd:  soft, nontender, nondistended  Right Groin:  Dressing removed, some minimal swelling noted, no erythema, no discharge, steri strips remain in place, no palpable hernia, no breakdown of sutures.    Ext:  no edema    Labs:                        18.8   13.04 )-----------( 272      ( 14 Jun 2019 16:41 )             54.5   06-14    136  |  102  |  15  ----------------------------<  129<H>  3.4<L>   |  22  |  0.94    Ca    9.3      14 Jun 2019 16:41  Mg     1.9     06-14    TPro  8.8<H>  /  Alb  4.2  /  TBili  1.1  /  DBili  x   /  AST  32  /  ALT  66  /  AlkPhos  81  06-14        Imaging:  CT abdomen/pelvis:  IMPRESSION:  1.  Limited evaluation due to the lack of significant intra-abdominal fat.    2.  Abundant stool is seen throughout the right colon, left colon and   rectosigmoid colon, correlate for constipation. There may be mild wall   thickening versus under distention of the left colon, correlate for a   mild left colitis.  There is no evidence for bowel obstruction. A normal   appendix is identified.    3.  Patient is noted to be status post right inguinal hernia repair. Mild   fluid is seen within the right inguinal canal. Mild air is seen within   the right inguinal canal and tracking superiorly along the right anterior   abdominal wall intramuscular fascia. A few locules of air are seen along   the right paracolic gutter, which is technically extraperitoneal,   presumably related to recent right inguinal surgery. An additional tiny   locule of air is seen adjacent to the inferior tip of the liver, also   presumably postoperative in nature.    4.  Several subcentimeter renal hypodensities are not characterized on   the current exam but probably represent cysts.    5.  Mild hepatomegaly.    6.  A 9 mm cortically based lucent lesion in the left iliac wing with a   sclerotic margin, which has a benign appearance. Amenable to follow-up or   characterization with MRI.    7.  Other findings, as above.

## 2019-06-17 DIAGNOSIS — K40.30 UNILATERAL INGUINAL HERNIA, WITH OBSTRUCTION, WITHOUT GANGRENE, NOT SPECIFIED AS RECURRENT: ICD-10-CM

## 2019-06-17 DIAGNOSIS — F17.210 NICOTINE DEPENDENCE, CIGARETTES, UNCOMPLICATED: ICD-10-CM

## 2019-06-17 DIAGNOSIS — I10 ESSENTIAL (PRIMARY) HYPERTENSION: ICD-10-CM

## 2019-06-17 DIAGNOSIS — D17.6 BENIGN LIPOMATOUS NEOPLASM OF SPERMATIC CORD: ICD-10-CM

## 2019-06-17 LAB — SURGICAL PATHOLOGY STUDY: SIGNIFICANT CHANGE UP

## 2019-08-26 ENCOUNTER — APPOINTMENT (OUTPATIENT)
Dept: GASTROENTEROLOGY | Facility: CLINIC | Age: 59
End: 2019-08-26

## 2019-09-10 ENCOUNTER — APPOINTMENT (OUTPATIENT)
Dept: GASTROENTEROLOGY | Facility: CLINIC | Age: 59
End: 2019-09-10

## 2023-06-05 NOTE — ED PROVIDER NOTE - CARE PROVIDERS DIRECT ADDRESSES
PATIENT AMBULATED TO RESTROOM, URINE SAMPLE TAKEN TO LAB. PATIENT SETTLED BACK IN BED.  BED RAILS UP X 2 FOR SAFETY      Jakub Bernard RN  06/05/23 [id number] ,DirectAddress_Unknown

## 2024-09-16 NOTE — ASU PATIENT PROFILE, ADULT - NUMBER OF YRS
Rounding, chart review, physical examination
chart review, patient interview/exam, review of labs/imaging, management of medical conditions, counseling/educating patient, discussion with consultants, documentation; excludes teaching and separately reported services
case complexity
30